# Patient Record
Sex: MALE | Race: BLACK OR AFRICAN AMERICAN | NOT HISPANIC OR LATINO | URBAN - METROPOLITAN AREA
[De-identification: names, ages, dates, MRNs, and addresses within clinical notes are randomized per-mention and may not be internally consistent; named-entity substitution may affect disease eponyms.]

---

## 2024-11-11 ENCOUNTER — OFFICE VISIT (OUTPATIENT)
Dept: INFECTIOUS DISEASES | Facility: CLINIC | Age: 29
End: 2024-11-11
Payer: COMMERCIAL

## 2024-11-11 DIAGNOSIS — Z22.7 LATENT TUBERCULOSIS BY BLOOD TEST: Primary | ICD-10-CM

## 2024-11-11 PROCEDURE — 1160F RVW MEDS BY RX/DR IN RCRD: CPT | Mod: CPTII,95,, | Performed by: INTERNAL MEDICINE

## 2024-11-11 PROCEDURE — 1159F MED LIST DOCD IN RCRD: CPT | Mod: CPTII,95,, | Performed by: INTERNAL MEDICINE

## 2024-11-11 PROCEDURE — 99203 OFFICE O/P NEW LOW 30 MIN: CPT | Mod: 95,,, | Performed by: INTERNAL MEDICINE

## 2024-11-11 NOTE — Clinical Note
He will need alt at [facility] tomorrow at 2 then monthly. If aalt ok rifampim 600mg for 4 months to [address]

## 2024-11-11 NOTE — PROGRESS NOTES
Patient is here for evaluation of positive quantiferon. PPD was negative in the past. Quantiferon was positive on 7/10/24, 7/15/24, 8/23/24.  There is no history of BCG. There is no history of exposure to TB. Was a  so in high risk job. Current symptoms: none. Patient denies cough, diarrhea, fatigue, fever, night sweats, and weight loss. No travel outside [place]. No PMHx. No meds. NKDA.  CXR neg.    ASSESSMENT:  Latent tuberculosis infection (LTBI).  No evidence of active TB.    RECOMMENDATIONS:  The patient has been counseled regarding risks and benefits of prophylactic therapy.   The patient would like to take medication for latent TB.  A prescription for rifampin 600 mg every evening for 4 months will be provided to the patient if ALT is normal.  The patient was counseled about possible side effects related to the medication.  The patient will have baseline ALT and monthly ALT while on therapy.   The patient was asked to let me know if there are any problems tolerating the medication.    The majority of this visit was spent reviewing results, discussing the implications, and answering the patients questions regarding latent TB.    Telehealth Visit     The patient location is:[facility]   The chief complaint leading to consultation is:   Visit type: Virtual visit  Total time spent with patient in counseling, reviewing labs, radiology, chart, documentation, coordination of care:  33       video virtual visit was related to patient preference/necessity.     Each patient to whom I provide medical services by telemedicine is:  (1) informed of the relationship between the physician and patient and the respective role of any other health care provider with respect to management of the patient; and (2) notified that they may decline to receive medical services by telemedicine and may withdraw from such care at any time.

## 2024-11-13 DIAGNOSIS — R74.8 ELEVATED LIVER ENZYMES: Primary | ICD-10-CM

## 2024-11-13 RX ORDER — RIFAMPIN 300 MG/1
300 CAPSULE ORAL EVERY 12 HOURS
Qty: 60 CAPSULE | Refills: 3 | Status: CANCELLED | OUTPATIENT
Start: 2024-11-13

## 2024-11-13 NOTE — TELEPHONE ENCOUNTER
Alt mildly elevated.  Left message Dr Baumgarten would like to get some more labs and have him see PCP or Hepatology.  She still would like him to start Rifampin I will call in to pharmacy listed.  And repeat alt in 2 weeks. Patient notified to no use alcohol or acetaminophen products Rifampin call ed in

## 2024-11-13 NOTE — TELEPHONE ENCOUNTER
----- Message from Nurse Stephania sent at 11/11/2024  4:43 PM CST -----  Baumgarten, Katherine L., MD Fernandez, Stephania FIGUEROA, LPN  He will need alt at [facility] tomorrow at 2 then monthly.  If aalt ok rifampim 600mg for 4 months to [address]

## 2024-11-15 ENCOUNTER — TELEPHONE (OUTPATIENT)
Dept: INFECTIOUS DISEASES | Facility: CLINIC | Age: 29
End: 2024-11-15
Payer: COMMERCIAL

## 2024-11-15 DIAGNOSIS — Z22.7 LATENT TUBERCULOSIS BY BLOOD TEST: Primary | ICD-10-CM

## 2024-11-15 DIAGNOSIS — R74.8 ELEVATED LIVER ENZYMES: ICD-10-CM

## 2024-11-15 RX ORDER — RIFAMPIN 300 MG/1
300 CAPSULE ORAL EVERY 12 HOURS
Qty: 60 CAPSULE | Refills: 3 | Status: SHIPPED | OUTPATIENT
Start: 2024-11-15

## 2024-11-15 RX ORDER — RIFAMPIN 300 MG/1
600 CAPSULE ORAL DAILY
Qty: 60 CAPSULE | Refills: 3 | Status: SHIPPED | OUTPATIENT
Start: 2024-11-15 | End: 2025-03-15

## 2024-11-15 NOTE — TELEPHONE ENCOUNTER
Reviewed patient labs. Mild elevation of liver enzymes. No alcohol.  OK to start rifampin.  Would follow up with primary MD.

## 2024-12-16 ENCOUNTER — TELEPHONE (OUTPATIENT)
Dept: INFECTIOUS DISEASES | Facility: CLINIC | Age: 29
End: 2024-12-16
Payer: COMMERCIAL

## 2024-12-16 NOTE — TELEPHONE ENCOUNTER
----- Message from Katherine Baumgarten, MD sent at 12/14/2024 10:20 PM CST -----  Needs heplisav, tdap, hep b s ab.

## 2025-01-29 RX ORDER — RIFAMPIN 300 MG/1
600 CAPSULE ORAL DAILY
Qty: 60 CAPSULE | Refills: 3 | Status: SHIPPED | OUTPATIENT
Start: 2025-01-29 | End: 2025-05-29

## 2025-01-29 RX ORDER — RIFAMPIN 300 MG/1
300 CAPSULE ORAL EVERY 12 HOURS
Qty: 60 CAPSULE | Refills: 3 | Status: SHIPPED | OUTPATIENT
Start: 2025-01-29

## 2025-03-05 RX ORDER — RIFAMPIN 300 MG/1
600 CAPSULE ORAL DAILY
Qty: 60 CAPSULE | Refills: 3 | Status: SHIPPED | OUTPATIENT
Start: 2025-03-05 | End: 2025-07-03

## 2025-03-05 RX ORDER — RIFAMPIN 300 MG/1
300 CAPSULE ORAL EVERY 12 HOURS
Qty: 60 CAPSULE | Refills: 3 | Status: SHIPPED | OUTPATIENT
Start: 2025-03-05